# Patient Record
Sex: FEMALE | Race: WHITE | NOT HISPANIC OR LATINO | URBAN - METROPOLITAN AREA
[De-identification: names, ages, dates, MRNs, and addresses within clinical notes are randomized per-mention and may not be internally consistent; named-entity substitution may affect disease eponyms.]

---

## 2017-01-05 ENCOUNTER — DOCUMENTATION ONLY (OUTPATIENT)
Dept: TRANSPLANT | Facility: CLINIC | Age: 57
End: 2017-01-05

## 2017-01-05 NOTE — PROGRESS NOTES
Dyan Crowder MD meeting 1/5/2017 reviewed case:  Referral recommended to be ended. Episode closed as letter had been sent to Ms. Dong

## 2017-04-04 ENCOUNTER — RECORDS - HEALTHEAST (OUTPATIENT)
Dept: LAB | Facility: CLINIC | Age: 57
End: 2017-04-04

## 2017-04-04 LAB
CHOLEST SERPL-MCNC: 140 MG/DL
FASTING STATUS PATIENT QL REPORTED: NORMAL
HDLC SERPL-MCNC: 51 MG/DL
LDLC SERPL CALC-MCNC: 78 MG/DL
TRIGL SERPL-MCNC: 57 MG/DL

## 2018-01-01 ENCOUNTER — RECORDS - HEALTHEAST (OUTPATIENT)
Dept: ADMINISTRATIVE | Facility: OTHER | Age: 58
End: 2018-01-01

## 2018-05-03 ENCOUNTER — RECORDS - HEALTHEAST (OUTPATIENT)
Dept: LAB | Facility: CLINIC | Age: 58
End: 2018-05-03

## 2018-05-03 LAB
ANION GAP SERPL CALCULATED.3IONS-SCNC: 16 MMOL/L (ref 5–18)
BUN SERPL-MCNC: 46 MG/DL (ref 8–22)
CALCIUM SERPL-MCNC: 8.6 MG/DL (ref 8.5–10.5)
CHLORIDE BLD-SCNC: 95 MMOL/L (ref 98–107)
CHOLEST SERPL-MCNC: 128 MG/DL
CO2 SERPL-SCNC: 23 MMOL/L (ref 22–31)
CREAT SERPL-MCNC: 6.39 MG/DL (ref 0.6–1.1)
FASTING STATUS PATIENT QL REPORTED: NORMAL
GFR SERPL CREATININE-BSD FRML MDRD: 7 ML/MIN/1.73M2
GLUCOSE BLD-MCNC: 169 MG/DL (ref 70–125)
HDLC SERPL-MCNC: 55 MG/DL
LDLC SERPL CALC-MCNC: 64 MG/DL
POTASSIUM BLD-SCNC: 5.5 MMOL/L (ref 3.5–5)
SODIUM SERPL-SCNC: 134 MMOL/L (ref 136–145)
TRIGL SERPL-MCNC: 45 MG/DL

## 2019-01-01 ENCOUNTER — COMMUNICATION - HEALTHEAST (OUTPATIENT)
Dept: GERIATRICS | Facility: CLINIC | Age: 59
End: 2019-01-01

## 2019-01-01 ENCOUNTER — RECORDS - HEALTHEAST (OUTPATIENT)
Dept: LAB | Facility: CLINIC | Age: 59
End: 2019-01-01

## 2019-01-01 ENCOUNTER — OFFICE VISIT - HEALTHEAST (OUTPATIENT)
Dept: GERIATRICS | Facility: CLINIC | Age: 59
End: 2019-01-01

## 2019-01-01 ENCOUNTER — AMBULATORY - HEALTHEAST (OUTPATIENT)
Dept: GERIATRICS | Facility: CLINIC | Age: 59
End: 2019-01-01

## 2019-01-01 ENCOUNTER — TRANSFERRED RECORDS (OUTPATIENT)
Dept: HEALTH INFORMATION MANAGEMENT | Facility: CLINIC | Age: 59
End: 2019-01-01

## 2019-01-01 ENCOUNTER — AMBULATORY - HEALTHEAST (OUTPATIENT)
Dept: OTHER | Facility: CLINIC | Age: 59
End: 2019-01-01

## 2019-01-01 ENCOUNTER — DOCUMENTATION ONLY (OUTPATIENT)
Dept: OTHER | Facility: CLINIC | Age: 59
End: 2019-01-01

## 2019-01-01 ENCOUNTER — RECORDS - HEALTHEAST (OUTPATIENT)
Dept: ADMINISTRATIVE | Facility: OTHER | Age: 59
End: 2019-01-01
Payer: MEDICARE

## 2019-01-01 ENCOUNTER — AMBULATORY - HEALTHEAST (OUTPATIENT)
Dept: ADMINISTRATIVE | Facility: CLINIC | Age: 59
End: 2019-01-01

## 2019-01-01 ENCOUNTER — COMMUNICATION - HEALTHEAST (OUTPATIENT)
Dept: ADMINISTRATIVE | Facility: CLINIC | Age: 59
End: 2019-01-01

## 2019-01-01 DIAGNOSIS — M46.24 ACUTE OSTEOMYELITIS OF THORACIC SPINE (H): ICD-10-CM

## 2019-01-01 DIAGNOSIS — B95.62 MRSA BACTEREMIA: ICD-10-CM

## 2019-01-01 DIAGNOSIS — R78.81 MRSA BACTEREMIA: ICD-10-CM

## 2019-01-01 DIAGNOSIS — Z99.2 ESRD (END STAGE RENAL DISEASE) ON DIALYSIS (H): ICD-10-CM

## 2019-01-01 DIAGNOSIS — N18.6 ESRD (END STAGE RENAL DISEASE) ON DIALYSIS (H): ICD-10-CM

## 2019-01-01 DIAGNOSIS — I33.0 ACUTE BACTERIAL ENDOCARDITIS: ICD-10-CM

## 2019-01-01 DIAGNOSIS — R53.81 PHYSICAL DECONDITIONING: ICD-10-CM

## 2019-01-01 LAB
AEROBIC BLOOD CULTURE BOTTLE: ABNORMAL
ALBUMIN SERPL-MCNC: 3.2 G/DL (ref 3.5–5)
ALP SERPL-CCNC: 119 U/L (ref 45–120)
ALP SERPL-CCNC: 121 U/L (ref 45–120)
ALP SERPL-CCNC: 133 U/L (ref 45–120)
ALT SERPL W P-5'-P-CCNC: <9 U/L (ref 0–45)
ANAEROBIC BLOOD CULTURE BOTTLE: ABNORMAL
ANION GAP SERPL CALCULATED.3IONS-SCNC: 16 MMOL/L (ref 5–18)
AST SERPL W P-5'-P-CCNC: 16 U/L (ref 0–40)
AST SERPL W P-5'-P-CCNC: 21 U/L (ref 0–40)
AST SERPL W P-5'-P-CCNC: 22 U/L (ref 0–40)
BACTERIA SPEC CULT: ABNORMAL
BASOPHILS # BLD AUTO: 0 THOU/UL (ref 0–0.2)
BASOPHILS # BLD AUTO: 0.1 THOU/UL (ref 0–0.2)
BASOPHILS NFR BLD AUTO: 1 % (ref 0–2)
BASOPHILS NFR BLD AUTO: 1 % (ref 0–2)
BASOPHILS NFR BLD AUTO: 2 % (ref 0–2)
BILIRUB DIRECT SERPL-MCNC: 0.2 MG/DL
BILIRUB SERPL-MCNC: 0.3 MG/DL (ref 0–1)
BILIRUB SERPL-MCNC: 0.4 MG/DL (ref 0–1)
BILIRUB SERPL-MCNC: 0.5 MG/DL (ref 0–1)
BUN SERPL-MCNC: 10 MG/DL (ref 8–22)
BUN SERPL-MCNC: 11 MG/DL (ref 8–22)
BUN SERPL-MCNC: 17 MG/DL (ref 8–22)
BUN SERPL-MCNC: 40 MG/DL (ref 8–22)
C DIFF TOX B STL QL: NEGATIVE
C DIFF TOX B STL QL: NEGATIVE
C REACTIVE PROTEIN LHE: 3.5 MG/DL (ref 0–0.8)
C REACTIVE PROTEIN LHE: 4.9 MG/DL (ref 0–0.8)
C REACTIVE PROTEIN LHE: 5.3 MG/DL (ref 0–0.8)
CALCIUM SERPL-MCNC: 10.8 MG/DL (ref 8.5–10.5)
CHLORIDE BLD-SCNC: 93 MMOL/L (ref 98–107)
CK SERPL-CCNC: 45 U/L (ref 30–190)
CK SERPL-CCNC: 51 U/L (ref 30–190)
CK-MB: 2 NG/ML (ref 0–7)
CO2 SERPL-SCNC: 27 MMOL/L (ref 22–31)
CREAT SERPL-MCNC: 2.62 MG/DL (ref 0.6–1.1)
CREAT SERPL-MCNC: 3.45 MG/DL (ref 0.6–1.1)
CREAT SERPL-MCNC: 3.87 MG/DL (ref 0.6–1.1)
CREAT SERPL-MCNC: 7.39 MG/DL (ref 0.6–1.1)
EOSINOPHIL # BLD AUTO: 0.1 THOU/UL (ref 0–0.4)
EOSINOPHIL # BLD AUTO: 0.2 THOU/UL (ref 0–0.4)
EOSINOPHIL # BLD AUTO: 0.2 THOU/UL (ref 0–0.4)
EOSINOPHIL # BLD AUTO: 0.4 THOU/UL (ref 0–0.4)
EOSINOPHIL # BLD AUTO: 0.5 THOU/UL (ref 0–0.4)
EOSINOPHIL NFR BLD AUTO: 14 % (ref 0–6)
EOSINOPHIL NFR BLD AUTO: 4 % (ref 0–6)
EOSINOPHIL NFR BLD AUTO: 7 % (ref 0–6)
EOSINOPHIL NFR BLD AUTO: 7 % (ref 0–6)
EOSINOPHIL NFR BLD AUTO: 8 % (ref 0–6)
ERYTHROCYTE [DISTWIDTH] IN BLOOD BY AUTOMATED COUNT: 15.6 % (ref 11–14.5)
ERYTHROCYTE [DISTWIDTH] IN BLOOD BY AUTOMATED COUNT: 16.1 % (ref 11–14.5)
ERYTHROCYTE [DISTWIDTH] IN BLOOD BY AUTOMATED COUNT: 18 % (ref 11–14.5)
ERYTHROCYTE [DISTWIDTH] IN BLOOD BY AUTOMATED COUNT: 18.2 % (ref 11–14.5)
ERYTHROCYTE [DISTWIDTH] IN BLOOD BY AUTOMATED COUNT: 18.3 % (ref 11–14.5)
ERYTHROCYTE [DISTWIDTH] IN BLOOD BY AUTOMATED COUNT: 18.3 % (ref 11–14.5)
GFR SERPL CREATININE-BSD FRML MDRD: 12 ML/MIN/1.73M2
GFR SERPL CREATININE-BSD FRML MDRD: 14 ML/MIN/1.73M2
GFR SERPL CREATININE-BSD FRML MDRD: 19 ML/MIN/1.73M2
GFR SERPL CREATININE-BSD FRML MDRD: 6 ML/MIN/1.73M2
GLUCOSE BLD-MCNC: 135 MG/DL (ref 70–125)
GRAM STAIN RESULT: ABNORMAL
HBA1C MFR BLD: 7.9 % (ref 4.2–6.1)
HCT VFR BLD AUTO: 30.8 % (ref 35–47)
HCT VFR BLD AUTO: 31.7 % (ref 35–47)
HCT VFR BLD AUTO: 33.7 % (ref 35–47)
HCT VFR BLD AUTO: 34.8 % (ref 35–47)
HCT VFR BLD AUTO: 41 % (ref 35–47)
HCT VFR BLD AUTO: 41.3 % (ref 35–47)
HGB BLD-MCNC: 10.3 G/DL (ref 12–16)
HGB BLD-MCNC: 10.7 G/DL (ref 12–16)
HGB BLD-MCNC: 12.1 G/DL (ref 12–16)
HGB BLD-MCNC: 12.7 G/DL (ref 12–16)
HGB BLD-MCNC: 9.3 G/DL (ref 12–16)
HGB BLD-MCNC: 9.4 G/DL (ref 12–16)
INR PPP: 1.67 (ref 0.9–1.1)
INR PPP: 1.74 (ref 0.9–1.1)
INR PPP: 1.83 (ref 0.9–1.1)
INR PPP: 2.04 (ref 0.9–1.1)
INR PPP: 2.12 (ref 0.9–1.1)
INR PPP: 2.12 (ref 0.9–1.1)
INR PPP: 2.16 (ref 0.9–1.1)
INR PPP: 2.18 (ref 0.9–1.1)
INR PPP: 2.54 (ref 0.9–1.1)
INR PPP: 3.25 (ref 0.9–1.1)
LYMPHOCYTES # BLD AUTO: 0.5 THOU/UL (ref 0.8–4.4)
LYMPHOCYTES # BLD AUTO: 0.8 THOU/UL (ref 0.8–4.4)
LYMPHOCYTES # BLD AUTO: 1 THOU/UL (ref 0.8–4.4)
LYMPHOCYTES NFR BLD AUTO: 19 % (ref 20–40)
LYMPHOCYTES NFR BLD AUTO: 20 % (ref 20–40)
LYMPHOCYTES NFR BLD AUTO: 21 % (ref 20–40)
LYMPHOCYTES NFR BLD AUTO: 26 % (ref 20–40)
LYMPHOCYTES NFR BLD AUTO: 36 % (ref 20–40)
MCH RBC QN AUTO: 29.1 PG (ref 27–34)
MCH RBC QN AUTO: 29.8 PG (ref 27–34)
MCH RBC QN AUTO: 29.8 PG (ref 27–34)
MCH RBC QN AUTO: 30.5 PG (ref 27–34)
MCH RBC QN AUTO: 30.6 PG (ref 27–34)
MCH RBC QN AUTO: 31.3 PG (ref 27–34)
MCHC RBC AUTO-ENTMCNC: 29.3 G/DL (ref 32–36)
MCHC RBC AUTO-ENTMCNC: 29.7 G/DL (ref 32–36)
MCHC RBC AUTO-ENTMCNC: 30.2 G/DL (ref 32–36)
MCHC RBC AUTO-ENTMCNC: 30.6 G/DL (ref 32–36)
MCHC RBC AUTO-ENTMCNC: 30.7 G/DL (ref 32–36)
MCHC RBC AUTO-ENTMCNC: 31 G/DL (ref 32–36)
MCV RBC AUTO: 102 FL (ref 80–100)
MCV RBC AUTO: 102 FL (ref 80–100)
MCV RBC AUTO: 98 FL (ref 80–100)
MCV RBC AUTO: 98 FL (ref 80–100)
MCV RBC AUTO: 99 FL (ref 80–100)
MCV RBC AUTO: 99 FL (ref 80–100)
MONOCYTES # BLD AUTO: 0.3 THOU/UL (ref 0–0.9)
MONOCYTES # BLD AUTO: 0.4 THOU/UL (ref 0–0.9)
MONOCYTES # BLD AUTO: 0.6 THOU/UL (ref 0–0.9)
MONOCYTES NFR BLD AUTO: 10 % (ref 2–10)
MONOCYTES NFR BLD AUTO: 11 % (ref 2–10)
MONOCYTES NFR BLD AUTO: 12 % (ref 2–10)
MONOCYTES NFR BLD AUTO: 12 % (ref 2–10)
MONOCYTES NFR BLD AUTO: 16 % (ref 2–10)
NEUTROPHILS # BLD AUTO: 0.8 THOU/UL (ref 2–7.7)
NEUTROPHILS # BLD AUTO: 1.5 THOU/UL (ref 2–7.7)
NEUTROPHILS # BLD AUTO: 1.6 THOU/UL (ref 2–7.7)
NEUTROPHILS # BLD AUTO: 2.1 THOU/UL (ref 2–7.7)
NEUTROPHILS # BLD AUTO: 3.4 THOU/UL (ref 2–7.7)
NEUTROPHILS NFR BLD AUTO: 37 % (ref 50–70)
NEUTROPHILS NFR BLD AUTO: 53 % (ref 50–70)
NEUTROPHILS NFR BLD AUTO: 54 % (ref 50–70)
NEUTROPHILS NFR BLD AUTO: 62 % (ref 50–70)
NEUTROPHILS NFR BLD AUTO: 62 % (ref 50–70)
PHOSPHATE SERPL-MCNC: 6 MG/DL (ref 2.5–4.5)
PLATELET # BLD AUTO: 147 THOU/UL (ref 140–440)
PLATELET # BLD AUTO: 148 THOU/UL (ref 140–440)
PLATELET # BLD AUTO: 165 THOU/UL (ref 140–440)
PLATELET # BLD AUTO: 191 THOU/UL (ref 140–440)
PLATELET # BLD AUTO: 203 THOU/UL (ref 140–440)
PLATELET # BLD AUTO: 258 THOU/UL (ref 140–440)
PMV BLD AUTO: 10.5 FL (ref 8.5–12.5)
PMV BLD AUTO: 10.9 FL (ref 8.5–12.5)
PMV BLD AUTO: 11 FL (ref 8.5–12.5)
PMV BLD AUTO: 11.4 FL (ref 8.5–12.5)
PMV BLD AUTO: 11.5 FL (ref 8.5–12.5)
PMV BLD AUTO: 11.6 FL (ref 8.5–12.5)
POTASSIUM BLD-SCNC: 4.3 MMOL/L (ref 3.5–5)
RBC # BLD AUTO: 3.12 MILL/UL (ref 3.8–5.4)
RBC # BLD AUTO: 3.23 MILL/UL (ref 3.8–5.4)
RBC # BLD AUTO: 3.29 MILL/UL (ref 3.8–5.4)
RBC # BLD AUTO: 3.5 MILL/UL (ref 3.8–5.4)
RBC # BLD AUTO: 4.06 MILL/UL (ref 3.8–5.4)
RBC # BLD AUTO: 4.17 MILL/UL (ref 3.8–5.4)
RIBOTYPE 027/NAP1/BI: NORMAL
RIBOTYPE 027/NAP1/BI: NORMAL
SODIUM SERPL-SCNC: 136 MMOL/L (ref 136–145)
WBC: 2.3 THOU/UL (ref 4–11)
WBC: 2.5 THOU/UL (ref 4–11)
WBC: 2.9 THOU/UL (ref 4–11)
WBC: 3.2 THOU/UL (ref 4–11)
WBC: 3.9 THOU/UL (ref 4–11)
WBC: 5.4 THOU/UL (ref 4–11)
WBC: 8.1 THOU/UL (ref 4–11)

## 2019-10-04 ENCOUNTER — AMBULATORY - HEALTHEAST (OUTPATIENT)
Dept: GERIATRICS | Facility: CLINIC | Age: 59
End: 2019-10-04

## 2021-05-30 NOTE — PROGRESS NOTES
[facility]    Facility:   [facility] [[id number]]   Code Status: POLST AVAILABLE    Reassessment 58-year-old female admitted to hospital 4/19 with mental status changes, found to have MRSA bacteremia and endocarditis, prolonged antibiotic course, thoracic osteomyelitis as source of infection, transferred to [place], completed course of antibiotic, transferred to TCU for rehabilitation and management of medical problems which include end-stage renal disease on hemodialysis, hypertension, deconditioning, anticoagulation for right ventricular thrombus.    Review of systems: patient is returned from dialysis. Complains of profound weakness, desires to sleep. No fluid accumulation requiring removal with dialysis. Denies focal neurologic symptoms. No fever sweats or chills. No cardiac or pulmonary symptomatology. Port site without inflammation. Ongoing low-grade pelvic discomfort, remainder of 12 point review of systems obtained negative.    Nursing staff report I and are not obtained today, patient went to dialysis prior to draw, to receive 2 mg Coumadin, recheck INR tomorrow.    Exam: alert, drowsy, in no apparent distress. Cognitively intact. No pharyngeal erythema. No HJR. S1 and S2 regular. Pulmonary exam without rales rhonchi or wheezes. Abdomen without masses tenderness organomegaly. Periphery without edema. Port site right arm bandaged, digital strength intact bilateral hands. Skin turgor intact. No calf tenderness or swelling.    Impression and plan:   recent MRSA bacteremia and endocarditis, patient remains afebrile, off antibiotic, continue to monitor.   End-stage renal disease on hemodialysis, tolerating dialysis.   Hypertension with satisfactory control of blood pressure.   Anticoagulation for right ventricular thrombus, INR not drawn today, review tomorrow.   Chronic depression on Cymbalta with mood stable.   Profound weakness post prolonged  hospitalization, continue rehabilitation measures.   Issues of concern reviewed with patient and nursing staff.      Electronically signed by: Denise Negrete MD

## 2021-05-30 NOTE — TELEPHONE ENCOUNTER
[facility] Nurse Triage Telephone Note      Provider: Denise Negrete MD  Facility: [facility]    Facility Type: TCU    Caller: Siva  Call Back Number:  [phone]    Allergies: Atorvastatin; Hydrocodone; and Codeine    Reason for call: Nurse calling to report that patient is notably more confused than normal.  She's jittery and restless.  Patient goes to dialysis on M-W-F.  VS:  T=101.8, P=105, R=12, DT=751/50, O2=92%RA.  Nephrology NP called yesterday and ordered a Heme 2 and blood cx to be drawn to rule out endocarditis.  Labs were drawn, but blood culture is pending.       Verbal Order/Direction given by Provider: Send to ER due to fever, altered mental status, tachycardia, tremors.    Provider giving order: Denise Negrete MD    Verbal order given to: Siva Leyva RN

## 2021-05-30 NOTE — TELEPHONE ENCOUNTER
[facility] Nurse Triage Anticoagulation Note      Provider: Denise Negrete MD  Facility: [facility]    Facility Type: TCU    Caller: Gabriela  Call Back Number:  [phone]    Reason for call: INR    Today s INR: 1.67  Previous INR: 7/16/19 2.83 new admission, orders 1mg daily.  7/5 2.75 1mg daily     7/6   1mg daily     7/7   1mg daily     7/8 2.91 1mg daily      7/9   1 mg daily     7/10 2.28 1.5mg daily     7/11   1.5mg daily     7/12 2.27 1.5 mg daily     7/13   1.5 mg daily      7/14   1.5 mg daily      7/15 3.52 0.5mg daily Last day abx    7/16 2.83 1mg daily                     Diagnosis/Goal:  Right ventricle thrombus  Heparin/Lovenox: No   Currently on ABX: No  Other interacting Medications: None  Missed or refused doses: No    Verbal Order/Direction given by Provider: Give 2mg tonight, check INR in am.    Provider giving order: Denise Negrete MD    Verbal order given to: Gabriela Goddard RN
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
## 2021-05-30 NOTE — TELEPHONE ENCOUNTER
[facility] Nurse Triage Anticoagulation Note      Provider: Denise Negrete MD  Facility: [facility]    Facility Type: TCU    Caller: Josee  Call Back Number:  [phone]    Reason for call: INR    Today s INR: 2.54  Previous INR:  7/21/19 INR 2.04 cont 3mg daily.  7/20/19 INR 1.83 3mg    Diagnosis/Goal:  Right Ventricle thrombus  Heparin/Lovenox: No   Currently on ABX: No  Other interacting Medications: None  Missed or refused doses: No    Verbal Order/Direction given by Provider: Cont 3mg daily. Next INR 7/26.    Provider giving order: Denise Negrete MD    Verbal order given to: Josee Goddard RN
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
## 2021-05-31 NOTE — TELEPHONE ENCOUNTER
[facility] Nurse Triage Anticoagulation Note      Provider: Denise Negrete MD  Facility: [facility]    Facility Type: TCU    Caller: Lidia  Call Back Number:  [phone]    Reason for call: INR    Today s INR: 2.18  Previous INR: 8/29/19 1.74 1.5mg daily.    Diagnosis/Goal: DVT  Heparin/Lovenox: No   Currently on ABX: Yes  Other interacting Medications: None  Missed or refused doses: No    Verbal Order/Direction given by Provider: Cont 1.5mg daily. Next INR 9/9.    Provider giving order: Denise Negrete MD    Verbal order given to: Lidia Goddard RN

## 2021-05-31 NOTE — TELEPHONE ENCOUNTER
[facility] Nurse Triage Anticoagulation Note      Provider: Denise Negrete MD  Facility: [facility]    Facility Type: TCU    Caller: Amy  Call Back Number:  [phone]    Reason for call: INR    Today s INR: 2.16  Previous INR: 8/13 2.12(1mg daily)    Diagnosis/Goal: DVT  Heparin/Lovenox: No   Currently on ABX: Yes; IV Daptomycin and IV Ceftaroline  Other interacting Medications: None  Missed or refused doses: No    Verbal Order/Direction given by Provider: Continue Warfarin 1mg daily.  Next INR 8/26/19.      Provider giving order: Denise Negrete MD    Verbal order given to: Lidia Leyva RN

## 2021-05-31 NOTE — PROGRESS NOTES
[facility]    Facility:   [facility] [[id number]]   Code Status: POLST AVAILABLE    Reassess 58-year-old female with recent readmission to hospital for MRSA bacteremia with psoas and diaphragmatic abscess, osteomyelitis T 12, on daptomycin and Ceftaroline, end-stage renal disease on dialysis,  hypertension.    Review of systems: continued profound fatigue, chills intermittently, tolerating dialysis. No current back pain. Generalized pain present using narcotic PRN. Desires nicotine patch, smoking cessation over past two months, desires to continue smoking cessation, feels tempted to smoke. Appetite poor. Strength diffusely diminished, denies focal neurologic symptoms. Unaware of palpitations. Concerns regarding chronicity of infection, desires discussion regarding management of infection and long-term prognosis. Multiple personal concerns. Remainder of 12 point review of systems obtained negative.    Nursing with concerns regarding medication review. Attempting to schedule IV antibiotic TI D around dialysis sessions. State INR was not drawn today, will be drawn tomorrow.    Exam: vital signs reviewed at dialysis. Patient in bed, fatigued, oriented times three. Skin turgor intact. No tremor. Diffuse decrease in muscle tone and strength, strength symmetrical. No HJR. No pharyngeal erythema. S1 and S2 regular. Pulmonary exam without rales rhonchi or wheezes. Abdomen without masses tenderness organomegaly. Periphery without edema. Joints without acute inflammatory change.    Impression and plan:   MRSA bacteremia with osteomyelitis psoas and diaphragmatic abscess, continuing antibiotic as prescribed by infectious diseases, confirm that daptomycin is administered.   Hypertension on hydralazine Norvasc and lisinopril with adequate control of blood pressure.   Anticoagulation with history of ventricular thrombus and VTE, INR not obtained this day, reassess in 24 hours, no  excessive bruising or bleeding.   End-stage renal disease on hemodialysis, profound fatigue multifactorial.   Deconditioning post prolonged hospitalization, continue rehabilitation.   History of tobacco use, nicotine patch 14 mg on a.m. off p.m. written, not smoking at present, tempted to smoke.   Total time of evaluation greater than 35 minutes, greater than 50% of time spent in coordination of care and counseling, issues addressed include tobacco habituation and cessation, management of osteomyelitis, etiology of fatigue, long-term management of multiple chronic medical issues. Additional discussion with nursing staff.      Electronically signed by: Denise Negrtee MD

## 2021-05-31 NOTE — PROGRESS NOTES
[facility]    Facility:   [facility] [[id number]]   Code Status: POLST AVAILABLE    Reassessment of the 58-year-old female with recurrent MRSA bacteremia, osteomyelitis of thoracic spine, on on Ceftaroline with daptomyosin, continues in TCU for antibiotic administration. History significant for end-stage renal disease on hemodialysis, chronic depression, hypertension, anticoagulation.    Review of systems: profound fatigue, does not wish to participate in physical therapy when she returns from dialysis, too fatigued. Intermittent sense of cold, no sweats. No cardiac or pulmonary symptoms. Mild lower thoracic pain, using thoracic support. No peripheral edema. No symptoms of cord compression. Remainder of 12 point review of systems obtained negative.    Exam: vital signs reviewed at dialysis. Drowsy, oriented times three in no apparent distress. S1 and S2 regular. Pulmonary exam without rales rhonchi or wheezes. Prominence of lower thoracic vertebral body. No sensory level. Periphery without edema. No calf tenderness or swelling. Skin turgor intact.    Laboratory studies hemoglobin 9.4, CRP 5.3, INR 2.12,, C. difficile negative.    Impression and plan: MR OCAMPO osteomyelitis and bacteremia, continues IV antibiotic per infectious diseases, remains afebrile, intermittent chills, no sweats. End-stage renal disease on hemodialysis, tolerating dialysis. Anemia of chronic disease, Hgb stable. Chronic anticoagulation with therapeutic INR 8/13. Hypertension with adequate control of blood pressure. Deconditioning, reviewed need for rehabilitation, patient too fatigued post dialysis to participate, will time physical therapy sessions around dialysis.      Electronically signed by: Denise Negrete MD

## 2021-05-31 NOTE — PROGRESS NOTES
[facility]    Facility:   [facility] [[id number]]   Code Status: POLST AVAILABLE      Reassessment 58-year-old female with recent hospitalization for MRSA osteomyelitis and septicemia, transferred to TCU, completed antibiotic, resumption of fever, readmitted with persistent osteomyelitis, on ceftaroline and daptomyosin, continuing anticoagulation with history of left ventricular thrombus, history of hypertension, end-stage renal disease on dialysis.    Review of systems: patient is seen with her son on this occasion. Increasing pain lower thoracic, finds TLSO to be uncomfortable, states she will be fit with new TLSO. Profound fatigue continues present. No fever sweats or chills. No current G.I. symptoms. Finds physical therapy to be taxing. No symptoms of cord compression. Remainder of 12 point review of systems obtained negative.    Exam: blood pressure 124/63, temperature 97.0, pulse 55, 02 saturation 93%. Fatigued, in bed, oriented times three, conversant. No pharyngeal erythema. No facial asymmetry. S1 and S2 irregular. Pulmonary exam without rales rhonchi or wheezes. Lower thoracic tenderness to palpation of vertebrae. Strength symmetrical bilateral lower extremities, no calf tenderness or swelling. No hand drift.    Impression and plan:   T12 - L1 osteomyelitis MRSA, continues IV antibiotic, persistent pain, no evidence of cord compression. Difficulty tolerating TLSO, new TLSO to be fit.   Hypertension with adequate control of blood pressure.   End-stage renal disease on hemodialysis.   Profound fatigue multifactorial.   Anticoagulation with INR followed on a regular basis.   Multiple concerns of son present and of patient reviewed.    Electronically signed by: Denise Negrete MD

## 2021-05-31 NOTE — TELEPHONE ENCOUNTER
[facility] Nurse Triage Anticoagulation Note      Provider: Denise Negrete MD  Facility: [facility]    Facility Type: TCU    Caller: Nirmala  Call Back Number:  [phone]    Reason for call: INR    Today s INR: 1.74  Previous INR: 8/20 2.16(1mg daily), 8/13 2.12(1mg daily)    Diagnosis/Goal: DVT  Heparin/Lovenox: No   Currently on ABX: Yes; Daptomycin and Cefto  Other interacting Medications: None  Missed or refused doses: No    Nurse reporting other lab results ordered per infectious disease.      Verbal Order/Direction given by Provider: Increase Warfarin to 1.5mg daily.  Next INR 9/3/19.  Send remaining lab results to her infectious disease MD.      Provider giving order: Denise Negrete MD    Verbal order given to: Nirmala Leyva RN

## 2021-05-31 NOTE — PROGRESS NOTES
[facility]    Facility:   [facility] [[id number]]   Code Status: POLST AVAILABLE    Reassessment of 58-year-old female with MR OCAMPO osteomyelitis/bacteremia, two hospitalizations, initially with thought of cardiac involvement, no vegetations recently observed, continues IV antibiotic, nursing report daptomyosin has not been given as order on discharge from hospital stated not released.    Review of systems: concerned regarding a lump which her daughter found on her back, describes tenderness of region. Unaware of lump being previously present. Profound fatigue present, difficulty participating in physical therapy after dialysis sessions. Denies cough sputum production fever sweats or chills. No cardiac symptomatology. Remainder of 12 point review of systems obtained negative.    Exam: blood pressure 117/59, temperature 97.4, pulse 60. Alert and oriented, highly conversant. No facial asymmetry. No HJR. S1 and S2 regular. Pulmonary exam without adventitious sounds. Abdomen without masses or tenderness. Prominent vertebral body with mild tenderness, no surrounding edema or erythema. Periphery without edema. Strength symmetrical lower extremities.    Impression and plan: MR OCAMPO osteomyelitis and bacteremia, nursing have confirmed with infectious diseases that daptomyosin is to be given with other IV antibiotic, patient remains afebrile. End-stage renal disease on hemodialysis, tolerating dialysis. Prominent vertebral body, area of patient concern is that involved with osteomyelitis. Profound fatigue multifactorial. Hypertension with adequate control of blood pressure. Chronic depression, on SSRI, mood mildly depressed. Multiple concerns are reviewed, medications reviewed, discussion with patient and nursing staff.      Electronically signed by: Denise Negrete MD

## 2021-05-31 NOTE — TELEPHONE ENCOUNTER
[facility] Nurse Triage Anticoagulation Note      Provider: Denise Negrete MD  Facility: [facility]    Facility Type: TCU    Caller: Nirmala  Call Back Number:  [phone]    Reason for call: INR    Today s INR: 2.12   Previous INR: 8/8 1.9 1mg daily     Diagnosis/Goal: RV thrombus  Heparin/Lovenox: No   Currently on ABX: Yes  Other interacting Medications: None  Missed or refused doses: No    Verbal Order/Direction given by Provider: 1mg daily next INR 8/20    Provider giving order: Denise Negrete MD    Verbal order given to: Nirmala uBrroughs RN

## 2021-05-31 NOTE — TELEPHONE ENCOUNTER
[facility] Nurse Triage Anticoagulation Note      Provider: Denise Negrete MD  Facility: [facility]    Facility Type: TCU    Caller: Symone  Call Back Number:  [phone]    Reason for call: INR    Today s INR:   Missed INR draw today as was out to Dialysis. Goes on M-W-F.  Previous INR:  8/20/19 INR 2.16 1mg daily.  8/13/19 INR 2.12 1mg daily.          Leaving Tues am 6:45 for another MD appt.    Diagnosis/Goal: DVT Prophylaxis  Heparin/Lovenox: No   Currently on ABX: Yes  Other interacting Medications: None  Missed or refused doses: No    Verbal Order/Direction given by Provider: Continue 1mg daily. Next INR Thurs 8/29, unless lab comes in Tues after pt returns from appt to draw someone else=then draw her tomorrow.    Provider giving order: Denise Negrete MD    Verbal order given to: Symone Goddard, RN

## 2021-05-31 NOTE — PROGRESS NOTES
[facility]    Facility:   [facility] [[id number]]   Code Status: POLST AVAILABLE  58-year-old female with recurrent osteomyelitis in MR SA bacteremia seen for reevaluation of multiple medical problems. Recent readmission to hospital with elevated temperature, followed by infectious disease on IV antibiotic, continues in TCU for antibiotic administration, management of medical problems, rehabilitation.    Review of systems: states she will be able to return to home setting to continue antibiotic. Finds stay in TCU to be distressing. Denies fever sweats or chills. Profound fatigue remains present. Difficulty performing in physical therapy. No cardiac or pulmonary symptoms. No focal neurologic deficits. Increasing pain lower thoracic. Remainder of 12 point review of systems obtained negative.    Exam: vital signs reviewed over past four days including afebrile status. Drowsy, highly conversant, in bed in no apparent distress. No HJR. Skin turgor intact. No pharyngeal erythema. S1 and S2 regular. Pulmonary exam without adventitious sounds. Abdomen without masses tenderness organomegaly. Periphery without edema. Strength symmetrical lower extremities. Joints without acute inflammatory change. Continued vertebral body tenderness lower thoracic.    Impression and plan:   osteomyelitis of T12 - L1, increasing pain, continues afebrile, continues IV antibiotic.   End-stage renal disease, significant fatigue post dialysis, finds physical therapy to be difficult due to generalized fatigue.   Increasing back pain, no new focal neurologic symptoms, no suggestion of cord irritation.   Hypertension with adequate control.   Chronic deconditioning, continue attempts at rehabilitation.   Desire to return to home setting, patient making arrangements for home administration of IV antibiotics which she believes will be covered by her insurance.        Electronically signed by: Denise  Lauren Negrete MD

## 2021-05-31 NOTE — PROGRESS NOTES
[facility]    Facility:   [facility] [[id number]]   Code Status: POLST AVAILABLE    Readmission evaluation to TCU with 58-year-old female. History is taken from accompanying hospital notes, patient gives an adequate history. End-stage renal disease on hemodialysis, hypertension, hyperparathyroidism, recent MRSA bacteremia, T 12/L1 osteomyelitis, on IV antibiotic, readmitted to hospital from dialysis with fever, found to have recurrent MRSA, left lower lobe pneumonia, left psoas  abscess,  left diaphragmatic abscess, evaluated by infectious diseases, recommendation for daptomyosin and Ceftaroline per IV TI D, no vegetations on repeat echocardiogram, history of right ventricular thrombus and VTE anticoagulated, stabilized and transferred back to TCU for administration of IV antibiotics and management of medical problems continuing dialysis three times weekly.    Past medical history, current medical problem list, drug allergies, current medication list, social history, family history, code status reviewed in epic.    Review of systems: profound fatigue, intermittent chills. Denies chest pain or palpitations. Recent smoking cessation. No focal neurologic deficits. Chronic restless leg syndrome, aching of limbs intermittent. No sweats. Unaware of palpitations. No intra-abdominal tenderness. Remainder of 12 point review of systems obtained negative.    Exam: minimal temperature elevation, vital signs reviewed, performed at dialysis. Fatigued, cognitively intact. No pharyngeal erythema, no facial asymmetry. No carotid bruits or HJR. Thyroid without nodularity. No hand drift or tremor. S1 and S2 regular. Pulmonary exam without rales rhonchi or wheezes. Abdomen without masses tenderness organomegaly. Periphery without edema. No hand drift or tremor. No calf tenderness or swelling. Joints without acute inflammatory change.    Impression and plan:   recurrent MRSA bacteremia,  psoas and diaphragmatic abscess, osteomyelitis, ceftarolne IV TID and daptomycin, reviewed with nursing staff.   Hypertension with adequate control of blood pressure.   End-stage renal disease on hemodialysis three times weekly, tolerating dialysis.   Hyperparathyroidism controlled.   Restless leg controlled.   Profound fatigue and deconditioning, need for rehabilitation.   Chronic tobacco habituation, recent smoking cessation, oxygenation satisfactory.   Hospital records reviewed, patient concerns reviewed, review of medications, discussion with patient and nursing staff. Total time of admission evaluation greater than 45 minutes, greater than 50% of time spent in coordination of care and counseling, issues reviewed include management of osteomyelitis, history of endocarditis, MRSA, profound fatigue, review of antibiotics and medication with nursing staff.      Electronically signed by: Denise Negrete MD

## 2021-06-01 NOTE — PROGRESS NOTES
[facility]    Facility:   [facility] [[id number]]   Code Status: POLST AVAILABLE     Reassessment 58-year-old female who continues in TCU for treatment of MRSA septicemia with osteomyelitis T12 - L1 on Ceftaroline and daptomyosin, continues dialysis for end-stage renal disease, history of right ventricular thrombus/VTE anticoagulated, hypertension and depression. Two recent hospitalizations for MRSA bacteremia.    Review of systems: continued significant back discomfort. Declining use of TLSO which is painful. Increasing back discomfort without sensory level over recent weeks. No fever sweats or chills. Profound fatigue present at all times. No cardiac or pulmonary symptoms. No excessive bruising or bleeding. Requests letter for work disability through October 7, requests note to state patient unable to work due to illness through October 7. Remainder of 12 point review of systems obtained negative.    Exam: alert and oriented, fatigued, observed in wheelchair and supine in bed. Skin turgor intact. Mucous membranes moist. No carotid bruits or HJR. S1 and S2 regular. Pulmonary exam without rales rhonchi or wheezes. Abdomen without masses tenderness organomegaly. Periphery without edema. Deformity lower thoracic spine with tender vertebrae. No sensory level. Pedal pulses symmetrical. No calf tenderness or swelling.    Impression and plan: MRSA osteomyelitis T12/L1, remains afebrile, laboratory studies pending on this date.   Anticoagulation with history of atrial thrombus, INR pending on this date, no excessive bruising or bleeding.   Hypertension with adequate control of blood pressure.   End-stage renal disease on dialysis.   Profound fatigue multifactorial.   Note written that patient is unable to work through October 7 due to IV treatment for osteomyelitis while residing in TCU.   Profound fatigue multifactorial.   Medications reviewed, multiple concerns  reviewed.      Electronically signed by: Denise Negrete MD

## 2021-06-01 NOTE — TELEPHONE ENCOUNTER
[facility] Nurse Triage Telephone Note      Provider: Denise Negrete MD  Facility: [facility]    Facility Type: TCU    Caller: Sherrie  Call Back Number:  [phone]    Allergies: Atorvastatin; Hydrocodone; and Codeine    Reason for call: Nurse reporting that patient missed her INR draw today due to being at an ID apppointment.  9/14 INR was 2.12(1mg daily), 9/9 INR 3.25(1mg 9/9, held on 9/10, then 1mg daily).  Of note, patient goes to dialysis on M-W-F.      Verbal Order/Direction given by Provider: Give Warfarin 1mg on 9/17 and 9/18.  Next INR 9/19/19.      Provider giving order: Denise Negrete MD    Verbal order given to: Sherrie Leyva RN

## 2021-06-01 NOTE — PROGRESS NOTES
[facility]    Facility:   [facility] [[id number]]   Code Status: POLST AVAILABLE    Reassessment 58-year-old female with end-stage renal disease on hemodialysis, MRSA bacteremia, osteomyelitis T 11/L1, continues IV ceftaroline and daptomyosin, dialysis three times weekly, history of hypertension and depression.    Review of systems: significant loose stools, midline clamp abdominal pain, no distention. Onset of symptoms over past 72 hours, denies blood or mucus in stool. No nausea or vomiting. Denies cardiac symptoms. Progressive increase in lower thoracic upper lumbar pain, no change in bowel or bladder control, no change in strength lower extremities. Depression ongoing. Continues anticoagulated, no excessive bruising or bleeding. Remainder of 12 point review of systems obtained negative.    Recent white count 2.5, INR and white count to have been rechecked today, patient was at dialysis, laboratory studies were not obtained.    Exam: appears uncomfortable, oriented, lying in bed on side with legs up drawn. Vital signs reviewed at dialysis. Skin turgor diminished. No pharyngeal erythema. S1 and S2 regular. Pulmonary exam without rales rhonchi or wheezes. Abdomen with hyperactive bowl sounds, nonspecific tenderness mid, no distention. Periphery without edema. Strength and muscle tone diminished and symmetrical.    Impression and plan:   MRSA bacteremia with osteomyelitis, persistent increase in back discomfort, no evidence of cord compression, follow-up with neurosurgery/orthopedics this week, repeat imaging studies this week by patient report, remains afebrile, persistent increase in CRP.   Leukopenia, followed by infectious diseases, repeat laboratory test not drawn today, nursing have contacted lab with need for both white count and INR, continue current Coumadin dose.   Hypertension with satisfactory control of blood pressure.  loose stools, C. difficile toxin  ordered, bowel medications discontinued, no evidence of acute abdomen, cautious monitoring indicated.   Chronic depression, mood remains diminished.   Concerns reviewed with patient and nursing staff.      Electronically signed by: Denise Negrete MD

## 2021-06-01 NOTE — TELEPHONE ENCOUNTER
[facility] Nurse Triage Anticoagulation Note      Provider: Denise Negrete MD  Facility: [facility]    Facility Type: TCU    Caller: Amy  Call Back Number:  [phone]    Reason for call: INR    Today s INR: 9/9 3.25 got 1.5mg   Previous INR: 9/3 2.18 1.5mg daily     Diagnosis/Goal: DVT  Heparin/Lovenox: No   Currently on ABX: Yes  Other interacting Medications: None  Missed or refused doses: No    Verbal Order/Direction given by Provider: hold 9/10 then give 1mg daily next INR 9/13    Provider giving order: Denise Negrete MD    Verbal order given to: Meri Burroughs, LASHAWN

## 2021-06-01 NOTE — TELEPHONE ENCOUNTER
[facility] - Meri RN calling    Patient was seen with ID yesterday in hospital, Meri is needing a stopdate for the IV antibiotics, if there is one in the books yet.     She can be reached back at [phone] (cell)

## 2021-06-01 NOTE — PROGRESS NOTES
[facility]    Facility:   [facility] [[id number]]   Code Status: POLST AVAILABLE    88-year-old female is seen for reassessment. History of end-stage renal disease, hypertension, chronic depression, MRSA bacteremia and osteomyelitis, continues IV antibiotics and followed by infectious diseases.    Review of systems: continued profound fatigue. States she is unable to sleep at night, melatonin not beneficial, previously used trazodone, would like to resume it. Mood remains relatively depressed. Increasing pain at site of osteomyelitis, nursing staff confirm patient is not wearing TLSO on a regular basis. Denies fever sweats or chills. No cardiac or pulmonary symptoms. Difficulty participating in physical therapy due to fatigue. Remainder of 12 point review of systems obtained negative.    Exam: drowsy, oriented, cognitively intact. Vital signs performed at dialysis today reviewed. No facial asymmetry. Skin turgor intact. No pharyngeal erythema. S1 and S2 regular. Pulmonary exam without rales rhonchi or wheezes. Abdomen without masses tenderness organomegaly. Periphery without edema. Deformity lower thoracic vertebral body with tenderness present, no sensory level.    Impression and plan:   MRSA bacteremia, osteomyelitis, afebrile, on IV antibiotic, increasing lower thoracic pain, declining use of TLSO, will be refit with new TLSO in eight days, is agreeable to attempts to use TLSO.   Insomnia by patient report, begin trazodone 25 mg QHS, melatonin not beneficial. Patient asleep during the day post dialysis, states she is unable to sleep at  HS.   Hypertension with adequate control  . End-stage renal disease on hemodialysis.   Chronic depression, mood remains relatively depressed.   Multiple concerns are reviewed.      Electronically signed by: Denise Negrete MD

## 2021-06-01 NOTE — PROGRESS NOTES
[facility]    Facility:   [facility] [[id number]]   Code Status: POLST AVAILABLE    Date of visit  9/2/19    Reevaluation of 58-year-old female who continues in TCU  for IV antibiotic administration for MRSA bacteremia with osteomyelitis. History of hypertension, chronic depression, end-stage renal disease.    Review of systems: increasing lower thoracic pain. Declines wearing TLSO due to discomfort, denies sensory level, no change in sensation her strength lower extremities. No bowel incontinence. Fatigue increasingly present. No sweats or chills. No cardiac or pulmonary symptoms. Anxious to return to home setting. Remainder of 12 point review of systems obtained negative.    Exam: vital signs reviewed over past four days including afebrile status. Lying in bed, has returned from dialysis, fatigued. Oriented. No facial asymmetry. No pharyngeal erythema. No HJR. S1 and S2 regular. Pulmonary exam without rales rhonchi or wheezes. Abdomen without masses tenderness organomegaly. Significant tenderness vertebral thoracic vertebrae, no sensory level. Periphery without edema. Pedal pulses intact.    Impression and plan:   MRSA bacteremia, osteomyelitis involving lower thoracic vertebrae, increasing thoracic pain, remains afebrile, continues IV antibiotic, followed by infectious diseases.   End-stage renal disease on dialysis, tolerating dialysis.   Chronic depression, mood remains depressed, anxious to return to home setting.   Hypertension with adequate control.   Profound fatigue multifactorial.   Issues reviewed with patient and nursing staff.      Electronically signed by: Denise Negrete MD

## 2021-06-03 VITALS — WEIGHT: 132.3 LBS | BODY MASS INDEX: 20.12 KG/M2

## 2021-06-16 PROBLEM — R53.81 PHYSICAL DECONDITIONING: Status: ACTIVE | Noted: 2019-01-01

## 2021-06-16 PROBLEM — R78.81 MRSA BACTEREMIA: Status: ACTIVE | Noted: 2019-01-01

## 2021-06-16 PROBLEM — B95.62 MRSA BACTEREMIA: Status: ACTIVE | Noted: 2019-01-01

## 2021-06-16 PROBLEM — I38 ENDOCARDITIS: Status: ACTIVE | Noted: 2019-01-01
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
## 2021-06-19 NOTE — LETTER
Letter by Denise Negrete MD at      Author: Denise Negrete MD Service: -- Author Type: --    Filed:  Encounter Date: 9/16/2019 Status: (Other)         Patient: Liliya Dong   MR Number: [id number]   YOB: 1960   Date of Visit: 9/16/2019     [facility]    Facility:   [facility] [[id number]]   Code Status: POLST AVAILABLE    Reassessment 58-year-old female with end-stage renal disease on hemodialysis, MRSA bacteremia, osteomyelitis T 11/L1, continues IV ceftaroline and daptomyosin, dialysis three times weekly, history of hypertension and depression.    Review of systems: significant loose stools, midline clamp abdominal pain, no distention. Onset of symptoms over past 72 hours, denies blood or mucus in stool. No nausea or vomiting. Denies cardiac symptoms. Progressive increase in lower thoracic upper lumbar pain, no change in bowel or bladder control, no change in strength lower extremities. Depression ongoing. Continues anticoagulated, no excessive bruising or bleeding. Remainder of 12 point review of systems obtained negative.    Recent white count 2.5, INR and white count to have been rechecked today, patient was at dialysis, laboratory studies were not obtained.    Exam: appears uncomfortable, oriented, lying in bed on side with legs up drawn. Vital signs reviewed at dialysis. Skin turgor diminished. No pharyngeal erythema. S1 and S2 regular. Pulmonary exam without rales rhonchi or wheezes. Abdomen with hyperactive bowl sounds, nonspecific tenderness mid, no distention. Periphery without edema. Strength and muscle tone diminished and symmetrical.    Impression and plan:   MRSA bacteremia with osteomyelitis, persistent increase in back discomfort, no evidence of cord compression, follow-up with neurosurgery/orthopedics this week, repeat imaging studies this week by patient report, remains afebrile, persistent increase in CRP.   Leukopenia,  followed by infectious diseases, repeat laboratory test not drawn today, nursing have contacted lab with need for both white count and INR, continue current Coumadin dose.   Hypertension with satisfactory control of blood pressure.  loose stools, C. difficile toxin ordered, bowel medications discontinued, no evidence of acute abdomen, cautious monitoring indicated.   Chronic depression, mood remains diminished.   Concerns reviewed with patient and nursing staff.      Electronically signed by: Denise Negrete MD

## 2021-06-19 NOTE — LETTER
Letter by Denise Negrete MD at      Author: Denise Negrete MD Service: -- Author Type: --    Filed:  Encounter Date: 8/12/2019 Status: (Other)         Patient: Liliya Dong   MR Number: [id number]   YOB: 1960   Date of Visit: 8/12/2019     [facility]    Facility:   [facility] [[id number]]   Code Status: POLST AVAILABLE    Reassess 58-year-old female with recent readmission to hospital for MRSA bacteremia with psoas and diaphragmatic abscess, osteomyelitis T 12, on daptomycin and Ceftaroline, end-stage renal disease on dialysis,  hypertension.    Review of systems: continued profound fatigue, chills intermittently, tolerating dialysis. No current back pain. Generalized pain present using narcotic PRN. Desires nicotine patch, smoking cessation over past two months, desires to continue smoking cessation, feels tempted to smoke. Appetite poor. Strength diffusely diminished, denies focal neurologic symptoms. Unaware of palpitations. Concerns regarding chronicity of infection, desires discussion regarding management of infection and long-term prognosis. Multiple personal concerns. Remainder of 12 point review of systems obtained negative.    Nursing with concerns regarding medication review. Attempting to schedule IV antibiotic TI D around dialysis sessions. State INR was not drawn today, will be drawn tomorrow.    Exam: vital signs reviewed at dialysis. Patient in bed, fatigued, oriented times three. Skin turgor intact. No tremor. Diffuse decrease in muscle tone and strength, strength symmetrical. No HJR. No pharyngeal erythema. S1 and S2 regular. Pulmonary exam without rales rhonchi or wheezes. Abdomen without masses tenderness organomegaly. Periphery without edema. Joints without acute inflammatory change.    Impression and plan:   MRSA bacteremia with osteomyelitis psoas and diaphragmatic abscess, continuing antibiotic as prescribed by  infectious diseases, confirm that daptomycin is administered.   Hypertension on hydralazine Norvasc and lisinopril with adequate control of blood pressure.   Anticoagulation with history of ventricular thrombus and VTE, INR not obtained this day, reassess in 24 hours, no excessive bruising or bleeding.   End-stage renal disease on hemodialysis, profound fatigue multifactorial.   Deconditioning post prolonged hospitalization, continue rehabilitation.   History of tobacco use, nicotine patch 14 mg on a.m. off p.m. written, not smoking at present, tempted to smoke.   Total time of evaluation greater than 35 minutes, greater than 50% of time spent in coordination of care and counseling, issues addressed include tobacco habituation and cessation, management of osteomyelitis, etiology of fatigue, long-term management of multiple chronic medical issues. Additional discussion with nursing staff.      Electronically signed by: Denise Negrete MD
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
## 2021-06-19 NOTE — LETTER
Letter by Denise Negrete MD at      Author: Denise Negrete MD Service: -- Author Type: --    Filed:  Encounter Date: 8/16/2019 Status: (Other)         Patient: Liliya Dong   MR Number: [id number]   YOB: 1960   Date of Visit: 8/16/2019     [facility]    Facility:   [facility] [[id number]]   Code Status: POLST AVAILABLE    Reassessment of the 58-year-old female with recurrent MRSA bacteremia, osteomyelitis of thoracic spine, on on Ceftaroline with daptomyosin, continues in TCU for antibiotic administration. History significant for end-stage renal disease on hemodialysis, chronic depression, hypertension, anticoagulation.    Review of systems: profound fatigue, does not wish to participate in physical therapy when she returns from dialysis, too fatigued. Intermittent sense of cold, no sweats. No cardiac or pulmonary symptoms. Mild lower thoracic pain, using thoracic support. No peripheral edema. No symptoms of cord compression. Remainder of 12 point review of systems obtained negative.    Exam: vital signs reviewed at dialysis. Drowsy, oriented times three in no apparent distress. S1 and S2 regular. Pulmonary exam without rales rhonchi or wheezes. Prominence of lower thoracic vertebral body. No sensory level. Periphery without edema. No calf tenderness or swelling. Skin turgor intact.    Laboratory studies hemoglobin 9.4, CRP 5.3, INR 2.12,, C. difficile negative.    Impression and plan: MR OCAMPO osteomyelitis and bacteremia, continues IV antibiotic per infectious diseases, remains afebrile, intermittent chills, no sweats. End-stage renal disease on hemodialysis, tolerating dialysis. Anemia of chronic disease, Hgb stable. Chronic anticoagulation with therapeutic INR 8/13. Hypertension with adequate control of blood pressure. Deconditioning, reviewed need for rehabilitation, patient too fatigued post dialysis to participate, will time physical  therapy sessions around dialysis.      Electronically signed by: Denise Negrete MD

## 2021-06-19 NOTE — LETTER
Letter by Denise Negrete MD at      Author: Denise Negrete MD Service: -- Author Type: --    Filed:  Encounter Date: 8/26/2019 Status: (Other)         Patient: Liliya Dong   MR Number: [id number]   YOB: 1960   Date of Visit: 8/26/2019     [facility]    Facility:   [facility] [[id number]]   Code Status: POLST AVAILABLE  58-year-old female with recurrent osteomyelitis in MR SA bacteremia seen for reevaluation of multiple medical problems. Recent readmission to hospital with elevated temperature, followed by infectious disease on IV antibiotic, continues in TCU for antibiotic administration, management of medical problems, rehabilitation.    Review of systems: states she will be able to return to home setting to continue antibiotic. Finds stay in TCU to be distressing. Denies fever sweats or chills. Profound fatigue remains present. Difficulty performing in physical therapy. No cardiac or pulmonary symptoms. No focal neurologic deficits. Increasing pain lower thoracic. Remainder of 12 point review of systems obtained negative.    Exam: vital signs reviewed over past four days including afebrile status. Drowsy, highly conversant, in bed in no apparent distress. No HJR. Skin turgor intact. No pharyngeal erythema. S1 and S2 regular. Pulmonary exam without adventitious sounds. Abdomen without masses tenderness organomegaly. Periphery without edema. Strength symmetrical lower extremities. Joints without acute inflammatory change. Continued vertebral body tenderness lower thoracic.    Impression and plan:   osteomyelitis of T12 - L1, increasing pain, continues afebrile, continues IV antibiotic.   End-stage renal disease, significant fatigue post dialysis, finds physical therapy to be difficult due to generalized fatigue.   Increasing back pain, no new focal neurologic symptoms, no suggestion of cord irritation.   Hypertension with adequate  control.   Chronic deconditioning, continue attempts at rehabilitation.   Desire to return to home setting, patient making arrangements for home administration of IV antibiotics which she believes will be covered by her insurance.        Electronically signed by: Denise Negrete MD

## 2021-06-19 NOTE — LETTER
Letter by Denise Negrete MD at      Author: Denise Negrete MD Service: -- Author Type: --    Filed:  Encounter Date: 8/23/2019 Status: (Other)         Patient: Liliya Dong   MR Number: [id number]   YOB: 1960   Date of Visit: 8/23/2019     [facility]    Facility:   [facility] [[id number]]   Code Status: POLST AVAILABLE      Reassessment 58-year-old female with recent hospitalization for MRSA osteomyelitis and septicemia, transferred to TCU, completed antibiotic, resumption of fever, readmitted with persistent osteomyelitis, on ceftaroline and daptomyosin, continuing anticoagulation with history of left ventricular thrombus, history of hypertension, end-stage renal disease on dialysis.    Review of systems: patient is seen with her son on this occasion. Increasing pain lower thoracic, finds TLSO to be uncomfortable, states she will be fit with new TLSO. Profound fatigue continues present. No fever sweats or chills. No current G.I. symptoms. Finds physical therapy to be taxing. No symptoms of cord compression. Remainder of 12 point review of systems obtained negative.    Exam: blood pressure 124/63, temperature 97.0, pulse 55, 02 saturation 93%. Fatigued, in bed, oriented times three, conversant. No pharyngeal erythema. No facial asymmetry. S1 and S2 irregular. Pulmonary exam without rales rhonchi or wheezes. Lower thoracic tenderness to palpation of vertebrae. Strength symmetrical bilateral lower extremities, no calf tenderness or swelling. No hand drift.    Impression and plan:   T12 - L1 osteomyelitis MRSA, continues IV antibiotic, persistent pain, no evidence of cord compression. Difficulty tolerating TLSO, new TLSO to be fit.   Hypertension with adequate control of blood pressure.   End-stage renal disease on hemodialysis.   Profound fatigue multifactorial.   Anticoagulation with INR followed on a regular basis.   Multiple concerns of  son present and of patient reviewed.    Electronically signed by: Denise Negrete MD

## 2021-06-19 NOTE — LETTER
Letter by Denise Negrete MD at      Author: Denise Negrete MD Service: -- Author Type: --    Filed:  Encounter Date: 8/19/2019 Status: (Other)         Patient: Liliya Dong   MR Number: [id number]   YOB: 1960   Date of Visit: 8/19/2019     [facility]    Facility:   [facility] [[id number]]   Code Status: POLST AVAILABLE    Reassessment of 58-year-old female with MR OCAMPO osteomyelitis/bacteremia, two hospitalizations, initially with thought of cardiac involvement, no vegetations recently observed, continues IV antibiotic, nursing report daptomyosin has not been given as order on discharge from hospital stated not released.    Review of systems: concerned regarding a lump which her daughter found on her back, describes tenderness of region. Unaware of lump being previously present. Profound fatigue present, difficulty participating in physical therapy after dialysis sessions. Denies cough sputum production fever sweats or chills. No cardiac symptomatology. Remainder of 12 point review of systems obtained negative.    Exam: blood pressure 117/59, temperature 97.4, pulse 60. Alert and oriented, highly conversant. No facial asymmetry. No HJR. S1 and S2 regular. Pulmonary exam without adventitious sounds. Abdomen without masses or tenderness. Prominent vertebral body with mild tenderness, no surrounding edema or erythema. Periphery without edema. Strength symmetrical lower extremities.    Impression and plan: MR OCAMPO osteomyelitis and bacteremia, nursing have confirmed with infectious diseases that daptomyosin is to be given with other IV antibiotic, patient remains afebrile. End-stage renal disease on hemodialysis, tolerating dialysis. Prominent vertebral body, area of patient concern is that involved with osteomyelitis. Profound fatigue multifactorial. Hypertension with adequate control of blood pressure. Chronic depression, on SSRI, mood mildly  depressed. Multiple concerns are reviewed, medications reviewed, discussion with patient and nursing staff.      Electronically signed by: Denise Negrete MD

## 2021-06-19 NOTE — LETTER
Letter by Denise Negrete MD at      Author: Denise Negrete MD Service: -- Author Type: --    Filed:  Encounter Date: 9/6/2019 Status: (Other)         Patient: Liliya Dong   MR Number: [id number]   YOB: 1960   Date of Visit: 9/6/2019     [facility]    Facility:   [facility] [[id number]]   Code Status: POLST AVAILABLE    88-year-old female is seen for reassessment. History of end-stage renal disease, hypertension, chronic depression, MRSA bacteremia and osteomyelitis, continues IV antibiotics and followed by infectious diseases.    Review of systems: continued profound fatigue. States she is unable to sleep at night, melatonin not beneficial, previously used trazodone, would like to resume it. Mood remains relatively depressed. Increasing pain at site of osteomyelitis, nursing staff confirm patient is not wearing TLSO on a regular basis. Denies fever sweats or chills. No cardiac or pulmonary symptoms. Difficulty participating in physical therapy due to fatigue. Remainder of 12 point review of systems obtained negative.    Exam: drowsy, oriented, cognitively intact. Vital signs performed at dialysis today reviewed. No facial asymmetry. Skin turgor intact. No pharyngeal erythema. S1 and S2 regular. Pulmonary exam without rales rhonchi or wheezes. Abdomen without masses tenderness organomegaly. Periphery without edema. Deformity lower thoracic vertebral body with tenderness present, no sensory level.    Impression and plan:   MRSA bacteremia, osteomyelitis, afebrile, on IV antibiotic, increasing lower thoracic pain, declining use of TLSO, will be refit with new TLSO in eight days, is agreeable to attempts to use TLSO.   Insomnia by patient report, begin trazodone 25 mg QHS, melatonin not beneficial. Patient asleep during the day post dialysis, states she is unable to sleep at  HS.   Hypertension with adequate control  . End-stage renal disease  on hemodialysis.   Chronic depression, mood remains relatively depressed.   Multiple concerns are reviewed.      Electronically signed by: Denise Negrete MD

## 2021-06-19 NOTE — LETTER
Letter by Denise Negrete MD at      Author: Denise Negrete MD Service: -- Author Type: --    Filed:  Encounter Date: 8/9/2019 Status: (Other)         Patient: Liliya Dong   MR Number: [id number]   YOB: 1960   Date of Visit: 8/9/2019     [facility]    Facility:   [facility] [[id number]]   Code Status: POLST AVAILABLE    Readmission evaluation to TCU with 58-year-old female. History is taken from accompanying hospital notes, patient gives an adequate history. End-stage renal disease on hemodialysis, hypertension, hyperparathyroidism, recent MRSA bacteremia, T 12/L1 osteomyelitis, on IV antibiotic, readmitted to hospital from dialysis with fever, found to have recurrent MRSA, left lower lobe pneumonia, left psoas  abscess,  left diaphragmatic abscess, evaluated by infectious diseases, recommendation for daptomyosin and Ceftaroline per IV TI D, no vegetations on repeat echocardiogram, history of right ventricular thrombus and VTE anticoagulated, stabilized and transferred back to TCU for administration of IV antibiotics and management of medical problems continuing dialysis three times weekly.    Past medical history, current medical problem list, drug allergies, current medication list, social history, family history, code status reviewed in epic.    Review of systems: profound fatigue, intermittent chills. Denies chest pain or palpitations. Recent smoking cessation. No focal neurologic deficits. Chronic restless leg syndrome, aching of limbs intermittent. No sweats. Unaware of palpitations. No intra-abdominal tenderness. Remainder of 12 point review of systems obtained negative.    Exam: minimal temperature elevation, vital signs reviewed, performed at dialysis. Fatigued, cognitively intact. No pharyngeal erythema, no facial asymmetry. No carotid bruits or HJR. Thyroid without nodularity. No hand drift or tremor. S1 and S2 regular. Pulmonary  exam without rales rhonchi or wheezes. Abdomen without masses tenderness organomegaly. Periphery without edema. No hand drift or tremor. No calf tenderness or swelling. Joints without acute inflammatory change.    Impression and plan:   recurrent MRSA bacteremia, psoas and diaphragmatic abscess, osteomyelitis, ceftarolne IV TID and daptomycin, reviewed with nursing staff.   Hypertension with adequate control of blood pressure.   End-stage renal disease on hemodialysis three times weekly, tolerating dialysis.   Hyperparathyroidism controlled.   Restless leg controlled.   Profound fatigue and deconditioning, need for rehabilitation.   Chronic tobacco habituation, recent smoking cessation, oxygenation satisfactory.   Hospital records reviewed, patient concerns reviewed, review of medications, discussion with patient and nursing staff. Total time of admission evaluation greater than 45 minutes, greater than 50% of time spent in coordination of care and counseling, issues reviewed include management of osteomyelitis, history of endocarditis, MRSA, profound fatigue, review of antibiotics and medication with nursing staff.      Electronically signed by: Denise Negrete MD

## 2021-06-19 NOTE — LETTER
Letter by Denise Negrete MD at      Author: Denise Negrete MD Service: -- Author Type: --    Filed:  Encounter Date: 7/22/2019 Status: (Other)         Patient: Liliya Dong   MR Number: [id number]   YOB: 1960   Date of Visit: 7/22/2019     [facility]    Facility:   [facility] [[id number]]   Code Status: POLST AVAILABLE    Reassessment 58-year-old female admitted to hospital 4/19 with mental status changes, found to have MRSA bacteremia and endocarditis, prolonged antibiotic course, thoracic osteomyelitis as source of infection, transferred to [place], completed course of antibiotic, transferred to TCU for rehabilitation and management of medical problems which include end-stage renal disease on hemodialysis, hypertension, deconditioning, anticoagulation for right ventricular thrombus.    Review of systems: patient is returned from dialysis. Complains of profound weakness, desires to sleep. No fluid accumulation requiring removal with dialysis. Denies focal neurologic symptoms. No fever sweats or chills. No cardiac or pulmonary symptomatology. Port site without inflammation. Ongoing low-grade pelvic discomfort, remainder of 12 point review of systems obtained negative.    Nursing staff report I and are not obtained today, patient went to dialysis prior to draw, to receive 2 mg Coumadin, recheck INR tomorrow.    Exam: alert, drowsy, in no apparent distress. Cognitively intact. No pharyngeal erythema. No HJR. S1 and S2 regular. Pulmonary exam without rales rhonchi or wheezes. Abdomen without masses tenderness organomegaly. Periphery without edema. Port site right arm bandaged, digital strength intact bilateral hands. Skin turgor intact. No calf tenderness or swelling.    Impression and plan:   recent MRSA bacteremia and endocarditis, patient remains afebrile, off antibiotic, continue to monitor.   End-stage renal disease on hemodialysis,  tolerating dialysis.   Hypertension with satisfactory control of blood pressure.   Anticoagulation for right ventricular thrombus, INR not drawn today, review tomorrow.   Chronic depression on Cymbalta with mood stable.   Profound weakness post prolonged hospitalization, continue rehabilitation measures.   Issues of concern reviewed with patient and nursing staff.      Electronically signed by: Denise Negrete MD

## 2021-06-19 NOTE — LETTER
Letter by Denise Negrete MD at      Author: Denise Negrete MD Service: -- Author Type: --    Filed:  Encounter Date: 9/4/2019 Status: (Other)         Patient: Liliya Dong   MR Number: [id number]   YOB: 1960   Date of Visit: 9/4/2019     [facility]    Facility:   [facility] [[id number]]   Code Status: POLST AVAILABLE    Date of visit  9/2/19    Reevaluation of 58-year-old female who continues in TCU  for IV antibiotic administration for MRSA bacteremia with osteomyelitis. History of hypertension, chronic depression, end-stage renal disease.    Review of systems: increasing lower thoracic pain. Declines wearing TLSO due to discomfort, denies sensory level, no change in sensation her strength lower extremities. No bowel incontinence. Fatigue increasingly present. No sweats or chills. No cardiac or pulmonary symptoms. Anxious to return to home setting. Remainder of 12 point review of systems obtained negative.    Exam: vital signs reviewed over past four days including afebrile status. Lying in bed, has returned from dialysis, fatigued. Oriented. No facial asymmetry. No pharyngeal erythema. No HJR. S1 and S2 regular. Pulmonary exam without rales rhonchi or wheezes. Abdomen without masses tenderness organomegaly. Significant tenderness vertebral thoracic vertebrae, no sensory level. Periphery without edema. Pedal pulses intact.    Impression and plan:   MRSA bacteremia, osteomyelitis involving lower thoracic vertebrae, increasing thoracic pain, remains afebrile, continues IV antibiotic, followed by infectious diseases.   End-stage renal disease on dialysis, tolerating dialysis.   Chronic depression, mood remains depressed, anxious to return to home setting.   Hypertension with adequate control.   Profound fatigue multifactorial.   Issues reviewed with patient and nursing staff.      Electronically signed by: Denise Negrete MD

## 2021-06-19 NOTE — LETTER
Letter by Denise Negrete MD at      Author: Denise Negrete MD Service: -- Author Type: --    Filed:  Encounter Date: 9/9/2019 Status: (Other)         Patient: Liliya Dong   MR Number: [id number]   YOB: 1960   Date of Visit: 9/9/2019     [facility]    Facility:   [facility] [[id number]]   Code Status: POLST AVAILABLE     Reassessment 58-year-old female who continues in TCU for treatment of MRSA septicemia with osteomyelitis T12 - L1 on Ceftaroline and daptomyosin, continues dialysis for end-stage renal disease, history of right ventricular thrombus/VTE anticoagulated, hypertension and depression. Two recent hospitalizations for MRSA bacteremia.    Review of systems: continued significant back discomfort. Declining use of TLSO which is painful. Increasing back discomfort without sensory level over recent weeks. No fever sweats or chills. Profound fatigue present at all times. No cardiac or pulmonary symptoms. No excessive bruising or bleeding. Requests letter for work disability through October 7, requests note to state patient unable to work due to illness through October 7. Remainder of 12 point review of systems obtained negative.    Exam: alert and oriented, fatigued, observed in wheelchair and supine in bed. Skin turgor intact. Mucous membranes moist. No carotid bruits or HJR. S1 and S2 regular. Pulmonary exam without rales rhonchi or wheezes. Abdomen without masses tenderness organomegaly. Periphery without edema. Deformity lower thoracic spine with tender vertebrae. No sensory level. Pedal pulses symmetrical. No calf tenderness or swelling.    Impression and plan: MRSA osteomyelitis T12/L1, remains afebrile, laboratory studies pending on this date.   Anticoagulation with history of atrial thrombus, INR pending on this date, no excessive bruising or bleeding.   Hypertension with adequate control of blood pressure.   End-stage renal  disease on dialysis.   Profound fatigue multifactorial.   Note written that patient is unable to work through October 7 due to IV treatment for osteomyelitis while residing in TCU.   Profound fatigue multifactorial.   Medications reviewed, multiple concerns reviewed.      Electronically signed by: Denise Negrete MD

## 2021-07-07 ENCOUNTER — POST MORTEM DOCUMENTATION (OUTPATIENT)
Dept: TRANSPLANT | Facility: CLINIC | Age: 61
End: 2021-07-07

## 2021-07-07 NOTE — PROGRESS NOTES
Received notification on of patient's death from [employer] that patient  10/3/2019. Patient never came for evaluation or transplant.  Episode resolved.   The Transplant Office has been notified that patient is . The Post Mortem Encounter has been completed.